# Patient Record
(demographics unavailable — no encounter records)

---

## 2017-04-07 DIAGNOSIS — I10 ESSENTIAL HYPERTENSION: Chronic | ICD-10-CM

## 2017-04-07 RX ORDER — LOSARTAN POTASSIUM AND HYDROCHLOROTHIAZIDE 25; 100 MG/1; MG/1
TABLET ORAL
Qty: 30 TAB | Refills: 0 | OUTPATIENT
Start: 2017-04-07

## 2017-04-07 NOTE — TELEPHONE ENCOUNTER
Patient last seen 3/28/16. No show for recommended 6 month follow up in September. No future appointments. Needs follow up appt for further refills.

## 2023-05-20 RX ORDER — LOSARTAN POTASSIUM AND HYDROCHLOROTHIAZIDE 25; 100 MG/1; MG/1
1 TABLET ORAL DAILY
COMMUNITY
Start: 2016-03-28